# Patient Record
Sex: FEMALE | Race: WHITE | Employment: PART TIME | URBAN - METROPOLITAN AREA
[De-identification: names, ages, dates, MRNs, and addresses within clinical notes are randomized per-mention and may not be internally consistent; named-entity substitution may affect disease eponyms.]

---
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
## 2020-02-20 NOTE — PROGRESS NOTES
"Interdisciplinary Assessment    Music Therapy     Occupational Therapy     Recreation Therapy    SUMMARY:  Pt filled out occupational therapy assessment.  She identified that she is in the hospital because \"I tried to kill myself by stop taking my medications.\"  She said that the hardest part of her life is \"home is constantly have arguments with my mom.\"  She would like to change her \"anger.\" She goes to \"my friend or my boyfriend's mom\" for help and support.  If she is stressed/overwhelmed she \"smokes weed or deep breathing\" to calm down.  Patient selected goals:    To deal with frustrations effectively  To increase motivation  To take care of personal health   \"By the time I leave the hospital I will \"find ways to control, ways to manage frustration better.\"    CLINICAL OBSERVATIONS:             07/23/17 1000   General Information   Date Initially Attended OT 07/22/17   Clinical Impression   Affect Appropriate to situation;Anxious;Fluctuates   Orientation Oriented to person, place and time   Appearance and ADLs General cleanliness observed in most areas;Disheveled   Attention to Internal Stimuli No observed signs   Interaction Skills Interacts appropriately with staff;Initiates appropriately with staff   Ability to Communicate Needs Independent   Verbal Content Articulate;Clear;Appropriate to topic   Ability to Maintain Boundaries Maintains appropriate physical boundaries;Maintains appropriate verbal boundaries   Participation Initiates participation;Independently participates   Concentration Concentrates 10-20 minutes   Ability to Concentrate With structure;Preoccupied   Follows and Comprehends Directions Independently follows multi-step directions;Independently follows 2 step verbal directions   Memory Delayed and immediate recall intact   Organization Independently organizes medium tasks   Decision Making Needs choices limited to 3 choices   Planning and Problem Solving Occasionally needs assist/feedback "   Ability to Apply and Learn Concepts Applies within group structure   Frustrations / Stress Tolerance Indirect responses to frustration;Needs further assessment   Level of Insight Identifies needs with structure/support   Self Esteem Takes risks with support and encouragement   Social Supports Identifies utilizing supports;Has knowledge of support systems                                                                          RECOMMENDATIONS:                                                                                                              .  Interventions to focus on decreasing symptoms of depression,  decreasing self-injurious behaviors, elimination of suicidal ideation and elevation of mood. Additional interventions to focus on identifying and managing feelings, stress management, exercise, and healthy coping skills.                                                                                                       .     Therapists contributing to assessment:  Mary Ellen Jo MS, OTR/L       S/P robot-assisted surgical procedure

## 2021-05-31 VITALS — HEIGHT: 63 IN | BODY MASS INDEX: 28.53 KG/M2 | WEIGHT: 161 LBS

## 2021-06-15 NOTE — PROGRESS NOTES
Correct pharmacy verified with patient and confirmed in snapshot? [x] yes []no    Charge captured ? [x] yes  [] no    Medications Phoned  to Pharmacy [] yes [x]no  Name of Pharmacist:  List Medications, including dose, quantity and instructions      Medication Prescriptions given to patient   [] yes  [x] no   List the name of the drug the prescription was written for.       Medications ordered this visit were e-scribed.  Verified by order class [x] yes  [] no  Vistaril 50 mg  Abilify 2 mg  Wellbutrin 2 mg    Medication changes or discontinuations were communicated to patient's pharmacy: [] yes  [x] no    UA collected [] yes  [x] no    [place] Prescription Monitoring Program Reviewed? [] yes  [x] no    Referrals were made to: therapy     Future appointment was made: [x] yes  [] no  With Cyndi    Dictation completed at time of chart check: [x] yes  [] no    I have checked the documentation for today s encounters and the above information has been reviewed and completed.

## 2021-06-15 NOTE — PROGRESS NOTES
"Patient here today to initiate psychiatric care. States she quit THC yesterday and has not drank since discharge. States in the past she was on Abilify and Wellbutrin and that combination helped her more. States depression 5/5 denies SI/HI, anxiety 5/5. States about 7 hours of sleep with rouble falling asleep and staying asleep. States she is currently hearing voices and when \"stressed\" she \"sees things\". States she is scared to eat due to living with her boyfriends mom and there not being enough food.    "

## 2021-09-29 ENCOUNTER — HOSPITAL ENCOUNTER (EMERGENCY)
Facility: CLINIC | Age: 21
Discharge: HOME | End: 2021-09-29
Attending: EMERGENCY MEDICINE | Admitting: EMERGENCY MEDICINE
Payer: COMMERCIAL

## 2021-09-29 VITALS
HEART RATE: 89 BPM | SYSTOLIC BLOOD PRESSURE: 119 MMHG | DIASTOLIC BLOOD PRESSURE: 81 MMHG | RESPIRATION RATE: 18 BRPM | TEMPERATURE: 98.1 F | OXYGEN SATURATION: 98 %

## 2021-09-29 DIAGNOSIS — L29.9 ITCHING: ICD-10-CM

## 2021-09-29 DIAGNOSIS — R74.8 ELEVATED LIVER ENZYMES: ICD-10-CM

## 2021-09-29 LAB
ALBUMIN SERPL-MCNC: 2.7 G/DL (ref 3.4–5)
ALP SERPL-CCNC: 240 U/L (ref 40–150)
ALT SERPL W P-5'-P-CCNC: 179 U/L (ref 0–50)
ANION GAP SERPL CALCULATED.3IONS-SCNC: 10 MMOL/L (ref 3–14)
AST SERPL W P-5'-P-CCNC: 110 U/L (ref 0–45)
BASOPHILS # BLD AUTO: 0 10E3/UL (ref 0–0.2)
BASOPHILS NFR BLD AUTO: 0 %
BILIRUB SERPL-MCNC: 0.7 MG/DL (ref 0.2–1.3)
BUN SERPL-MCNC: 5 MG/DL (ref 7–30)
CALCIUM SERPL-MCNC: 8.5 MG/DL (ref 8.5–10.1)
CHLORIDE BLD-SCNC: 108 MMOL/L (ref 94–109)
CO2 SERPL-SCNC: 19 MMOL/L (ref 20–32)
CREAT SERPL-MCNC: 0.48 MG/DL (ref 0.52–1.04)
EOSINOPHIL # BLD AUTO: 0.2 10E3/UL (ref 0–0.7)
EOSINOPHIL NFR BLD AUTO: 2 %
ERYTHROCYTE [DISTWIDTH] IN BLOOD BY AUTOMATED COUNT: 12.4 % (ref 10–15)
GFR SERPL CREATININE-BSD FRML MDRD: >90 ML/MIN/1.73M2
GLUCOSE BLD-MCNC: 105 MG/DL (ref 70–99)
HCT VFR BLD AUTO: 33.9 % (ref 35–47)
HGB BLD-MCNC: 11.3 G/DL (ref 11.7–15.7)
HOLD SPECIMEN: NORMAL
HOLD SPECIMEN: NORMAL
IMM GRANULOCYTES # BLD: 0.1 10E3/UL
IMM GRANULOCYTES NFR BLD: 1 %
LYMPHOCYTES # BLD AUTO: 1.9 10E3/UL (ref 0.8–5.3)
LYMPHOCYTES NFR BLD AUTO: 19 %
MCH RBC QN AUTO: 29 PG (ref 26.5–33)
MCHC RBC AUTO-ENTMCNC: 33.3 G/DL (ref 31.5–36.5)
MCV RBC AUTO: 87 FL (ref 78–100)
MONOCYTES # BLD AUTO: 0.7 10E3/UL (ref 0–1.3)
MONOCYTES NFR BLD AUTO: 7 %
MONOCYTES NFR BLD AUTO: NEGATIVE %
NEUTROPHILS # BLD AUTO: 7.4 10E3/UL (ref 1.6–8.3)
NEUTROPHILS NFR BLD AUTO: 71 %
NRBC # BLD AUTO: 0 10E3/UL
NRBC BLD AUTO-RTO: 0 /100
PLATELET # BLD AUTO: 282 10E3/UL (ref 150–450)
POTASSIUM BLD-SCNC: 3.7 MMOL/L (ref 3.4–5.3)
PROT SERPL-MCNC: 6.8 G/DL (ref 6.8–8.8)
RBC # BLD AUTO: 3.9 10E6/UL (ref 3.8–5.2)
SARS-COV-2 RNA RESP QL NAA+PROBE: NEGATIVE
SODIUM SERPL-SCNC: 137 MMOL/L (ref 133–144)
WBC # BLD AUTO: 10.3 10E3/UL (ref 4–11)

## 2021-09-29 PROCEDURE — C9803 HOPD COVID-19 SPEC COLLECT: HCPCS

## 2021-09-29 PROCEDURE — 36415 COLL VENOUS BLD VENIPUNCTURE: CPT | Performed by: EMERGENCY MEDICINE

## 2021-09-29 PROCEDURE — U0003 INFECTIOUS AGENT DETECTION BY NUCLEIC ACID (DNA OR RNA); SEVERE ACUTE RESPIRATORY SYNDROME CORONAVIRUS 2 (SARS-COV-2) (CORONAVIRUS DISEASE [COVID-19]), AMPLIFIED PROBE TECHNIQUE, MAKING USE OF HIGH THROUGHPUT TECHNOLOGIES AS DESCRIBED BY CMS-2020-01-R: HCPCS | Performed by: EMERGENCY MEDICINE

## 2021-09-29 PROCEDURE — 86308 HETEROPHILE ANTIBODY SCREEN: CPT | Performed by: EMERGENCY MEDICINE

## 2021-09-29 PROCEDURE — 85025 COMPLETE CBC W/AUTO DIFF WBC: CPT | Performed by: EMERGENCY MEDICINE

## 2021-09-29 PROCEDURE — 99283 EMERGENCY DEPT VISIT LOW MDM: CPT

## 2021-09-29 PROCEDURE — 80053 COMPREHEN METABOLIC PANEL: CPT | Performed by: EMERGENCY MEDICINE

## 2021-09-29 ASSESSMENT — ENCOUNTER SYMPTOMS
FEVER: 0
SORE THROAT: 0
MYALGIAS: 0
COUGH: 1
SHORTNESS OF BREATH: 1
ABDOMINAL PAIN: 0
NAUSEA: 0
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
## 2021-09-29 NOTE — ED TRIAGE NOTES
Patient 31 weeks pregnant, woke up with itching and rash all over body. Endorses congestion and cold symptoms as well, COVID negative 2 days ago.
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
## 2024-09-12 NOTE — PROGRESS NOTES
Patient had a withdrawn shift.    Patient did not require seclusion/restraints to manage behavior.    Bella Alas did not participate in groups and was not visible in the milieu.    Notable mental health symptoms during this shift:irritability  distractable    Patient is working on these coping/social skills: Distraction    Visitors during this shift included none.  Overall, the visit was NA.  Significant events during the visit included NA.    Other information about this shift: Pt was calm and cooperative with staff.  She spent most of the shift in her room, so she did not socialize much with peers. Her affect was flat but brightened on approach. When I checked in with her, she said she fels a little irritated but otherwise ok. She says she feels like she is having mostly physical problems. She said she doesn't feel depressed or anxious, and she said she feels safe as long as she doesn't ave to go back to her mom. She said she has some suicidal thoughts, but mostly because she is in a lot of pain. She denies SIB.   Pt given discharge instruction, medication reviewed with the pt, follow up appts reviewed. All questions and concerns addressed. Pt verbalized understanding. IV, telemetry removed. Discharge paperwork given to patient. Pt in room waiting for transport.